# Patient Record
Sex: MALE | Race: WHITE | NOT HISPANIC OR LATINO | Employment: PART TIME | URBAN - METROPOLITAN AREA
[De-identification: names, ages, dates, MRNs, and addresses within clinical notes are randomized per-mention and may not be internally consistent; named-entity substitution may affect disease eponyms.]

---
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
## 2021-04-30 ENCOUNTER — NON-PROVIDER VISIT (OUTPATIENT)
Dept: URGENT CARE | Facility: PHYSICIAN GROUP | Age: 21
End: 2021-04-30

## 2021-04-30 DIAGNOSIS — Z02.1 PRE-EMPLOYMENT DRUG SCREENING: ICD-10-CM

## 2021-04-30 LAB
AMP AMPHETAMINE: NORMAL
COC COCAINE: NORMAL
INT CON NEG: NEGATIVE
INT CON POS: POSITIVE
MET METHAMPHETAMINES: NORMAL
OPI OPIATES: NORMAL
PCP PHENCYCLIDINE: NORMAL
POC DRUG COMMENT 753798-OCCUPATIONAL HEALTH: NEGATIVE
THC: NORMAL

## 2021-04-30 PROCEDURE — 80305 DRUG TEST PRSMV DIR OPT OBS: CPT | Performed by: PHYSICIAN ASSISTANT

## 2023-10-02 ENCOUNTER — APPOINTMENT (OUTPATIENT)
Dept: RADIOLOGY | Facility: MEDICAL CENTER | Age: 23
End: 2023-10-02
Attending: EMERGENCY MEDICINE
Payer: COMMERCIAL

## 2023-10-02 ENCOUNTER — HOSPITAL ENCOUNTER (EMERGENCY)
Facility: MEDICAL CENTER | Age: 23
End: 2023-10-02
Attending: EMERGENCY MEDICINE
Payer: COMMERCIAL

## 2023-10-02 VITALS
TEMPERATURE: 97.5 F | WEIGHT: 160 LBS | OXYGEN SATURATION: 96 % | BODY MASS INDEX: 27.9 KG/M2 | SYSTOLIC BLOOD PRESSURE: 133 MMHG | HEART RATE: 75 BPM | DIASTOLIC BLOOD PRESSURE: 78 MMHG | RESPIRATION RATE: 16 BRPM

## 2023-10-02 DIAGNOSIS — R07.89 CHEST WALL PAIN: ICD-10-CM

## 2023-10-02 LAB
ANION GAP SERPL CALC-SCNC: 15 MMOL/L (ref 7–16)
BUN SERPL-MCNC: 20 MG/DL (ref 8–22)
CALCIUM SERPL-MCNC: 10.3 MG/DL (ref 8.4–10.2)
CHLORIDE SERPL-SCNC: 102 MMOL/L (ref 96–112)
CO2 SERPL-SCNC: 21 MMOL/L (ref 20–33)
CREAT SERPL-MCNC: 0.93 MG/DL (ref 0.5–1.4)
EKG IMPRESSION: NORMAL
GFR SERPLBLD CREATININE-BSD FMLA CKD-EPI: 117 ML/MIN/1.73 M 2
GLUCOSE SERPL-MCNC: 101 MG/DL (ref 65–99)
LIPASE SERPL-CCNC: 35 U/L (ref 11–82)
POTASSIUM SERPL-SCNC: 3.8 MMOL/L (ref 3.6–5.5)
SODIUM SERPL-SCNC: 138 MMOL/L (ref 135–145)
TROPONIN T SERPL-MCNC: <6 NG/L (ref 6–19)

## 2023-10-02 PROCEDURE — 99284 EMERGENCY DEPT VISIT MOD MDM: CPT

## 2023-10-02 PROCEDURE — A9270 NON-COVERED ITEM OR SERVICE: HCPCS | Performed by: EMERGENCY MEDICINE

## 2023-10-02 PROCEDURE — 93005 ELECTROCARDIOGRAM TRACING: CPT | Performed by: EMERGENCY MEDICINE

## 2023-10-02 PROCEDURE — 84484 ASSAY OF TROPONIN QUANT: CPT

## 2023-10-02 PROCEDURE — 71046 X-RAY EXAM CHEST 2 VIEWS: CPT

## 2023-10-02 PROCEDURE — 700102 HCHG RX REV CODE 250 W/ 637 OVERRIDE(OP): Performed by: EMERGENCY MEDICINE

## 2023-10-02 PROCEDURE — 83690 ASSAY OF LIPASE: CPT

## 2023-10-02 PROCEDURE — 36415 COLL VENOUS BLD VENIPUNCTURE: CPT

## 2023-10-02 PROCEDURE — 80048 BASIC METABOLIC PNL TOTAL CA: CPT

## 2023-10-02 RX ORDER — IBUPROFEN 600 MG/1
600 TABLET ORAL ONCE
Status: COMPLETED | OUTPATIENT
Start: 2023-10-02 | End: 2023-10-02

## 2023-10-02 RX ADMIN — IBUPROFEN 600 MG: 600 TABLET, FILM COATED ORAL at 08:07

## 2023-10-02 ASSESSMENT — HEART SCORE
HISTORY: SLIGHTLY SUSPICIOUS
AGE: <45
ECG: NON-SPECIFIC REPOLARIZATION DISTURBANCE
HEART SCORE: 1
RISK FACTORS: NO KNOWN RISK FACTORS
TROPONIN: LESS THAN OR EQUAL TO NORMAL LIMIT

## 2023-10-02 ASSESSMENT — PAIN DESCRIPTION - DESCRIPTORS: DESCRIPTORS: ACHING

## 2023-10-02 NOTE — ED TRIAGE NOTES
Chief Complaint   Patient presents with    Chest Wall Pain     Pt ambultes to ed with c/o chest wall pain that gets worse with inspiration x 1 day, denies recent trauma     /89   Pulse 79   Temp 36.6 °C (97.9 °F) (Temporal)   Resp 16   Wt 72.6 kg (160 lb)   SpO2 99%   BMI 27.90 kg/m²

## 2023-10-02 NOTE — ED PROVIDER NOTES
ED Provider Note    CHIEF COMPLAINT  Chief Complaint   Patient presents with    Chest Wall Pain     Pt ambultes to ed with c/o chest wall pain that gets worse with inspiration x 1 day, denies recent trauma       EXTERNAL RECORDS REVIEWED  Patient has 2 prior encounters.  Both for preemployment screening, drug stress testing.    HPI/ROS  LIMITATION TO HISTORY   Select: : None  OUTSIDE HISTORIAN(S):  none    Ryan Bryan is a 23 y.o. male who presents to the emergency department with a chief complaint of left-sided lower chest wall tenderness.  The area is tender to palpation but there are no overlying skin changes.  Patient denies fever cough or cold symptoms.  Symptoms initially started yesterday morning he woke with similar symptoms.  To a lesser extent, they continued throughout the day.  He woke again with symptoms prompting his ED visit.  He states he was able to eat normally yesterday.  No nausea or vomiting.  No change in his bowel or urine habits.  He has never had symptoms before.  Denies any recent trauma, falls, new activities or exercises.  No shortness of breath.  No recent travel.  No personal history of thromboembolic disease.  No recent injuries.  He takes no medications and has no allergies.  He does not drink, smoke or use drugs.  He denies any significant family history.  No prior surgical history.    PAST MEDICAL HISTORY   None reported    SURGICAL HISTORY  patient denies any surgical history    FAMILY HISTORY  No family history of cardiac disease, stroke or thromboembolic disease    SOCIAL HISTORY  Denies smoking, drinking, drugs      CURRENT MEDICATIONS  Home Medications    **Home medications have not yet been reviewed for this encounter**         ALLERGIES  No Known Allergies    PHYSICAL EXAM  VITAL SIGNS: /78   Pulse 75   Temp 36.4 °C (97.5 °F)   Resp 16   Wt 72.6 kg (160 lb)   SpO2 96%   BMI 27.90 kg/m²    Vitals reviewed.  Constitutional: Patient is oriented to person,  place, and time. Appears well-developed and well-nourished. No distress.    Head: Normocephalic and atraumatic.    Mouth/Throat: Oropharynx is clear.  Eyes: Conjunctivae are normal. Pupils are equal, round.  Neck: Normal range of motion. Neck supple.  Cardiovascular: Normal rate, regular rhythm and normal heart sounds.  Pulmonary/Chest: Effort normal and breath sounds normal. No respiratory distress, no wheezes, rhonchi, or rales.  Left lower, anterior chest wall tenderness.  Abdominal: Soft. Bowel sounds are normal. There is no tenderness, rebound or guarding, or peritoneal signs  Musculoskeletal: No edema and no tenderness.  Neurological: No cranial nerve deficits, on passive examination. Normal gait.  No focal deficits.   Skin: Skin is warm and dry. No erythema. No pallor.   Psychiatric: Patient has a normal mood and affect.     DIAGNOSTIC STUDIES / PROCEDURES  EKG  I have independently interpreted this EKG    LABS  Results for orders placed or performed during the hospital encounter of 10/02/23   TROPONIN   Result Value Ref Range    Troponin T <6 6 - 19 ng/L   Basic Metabolic Panel   Result Value Ref Range    Sodium 138 135 - 145 mmol/L    Potassium 3.8 3.6 - 5.5 mmol/L    Chloride 102 96 - 112 mmol/L    Co2 21 20 - 33 mmol/L    Glucose 101 (H) 65 - 99 mg/dL    Bun 20 8 - 22 mg/dL    Creatinine 0.93 0.50 - 1.40 mg/dL    Calcium 10.3 (H) 8.4 - 10.2 mg/dL    Anion Gap 15.0 7.0 - 16.0   LIPASE   Result Value Ref Range    Lipase 35 11 - 82 U/L   ESTIMATED GFR   Result Value Ref Range    GFR (CKD-EPI) 117 >60 mL/min/1.73 m 2   EKG   Result Value Ref Range    Report       [facility]    Test Date:  2023-10-02  Pt Name:    BETSY LARA              Department: [facility]  MRN:        [id number]                      [facility]  Gender:     Male                         Technician: [id number]  :        2000                   Requested By:MELVIN FRANCIS  Order #:    [id number]                     Reading MD: MELVIN FRANCIS DO    Measurements  Intervals                                Axis  Rate:       72                           P:          50  KS:         130                          QRS:        49  QRSD:       95                           T:          24  QT:         365  QTc:        400    Interpretive Statements  Sinus rhythm  ST elev, probable normal early repol pattern  Baseline wander in lead(s) V1,V2,V3  No previous ECG available for comparison  Electronically Signed On 10- 06:06:18 PDT by MELVIN FRANCIS DO         RADIOLOGY  I have independently interpreted the diagnostic imaging associated with this visit and am waiting the final reading from the radiologist.   My preliminary interpretation is as follows: [facility]  Radiologist interpretation:   DX-CHEST-2 VIEWS   Final Result         1.  No acute cardiopulmonary disease.        COURSE & MEDICAL DECISION MAKING    ED Observation Status? No; Patient does not meet criteria for ED Observation.     INITIAL ASSESSMENT, COURSE AND PLAN  Care Narrative:   This is a pleasant, previously healthy, overall well-appearing 23-year-old male with reassuring vital signs.  He is not tachypneic.  He is not hypoxic.  He has chest wall pain.  He has ST elevation in a and likely early repole pattern.  Clinically, he does not have suggestion of infectious etiology.  I do not suspect pneumonia.  He has no other GI symptoms.  He has minimal risk factors.  He does not smoke, use drugs or have a family history of heart disease.  I have ordered a chest x-ray for further evaluation.  Possibility for a spontaneous pneumothorax.  Additionally, I have ordered a troponin, BNP and lipase. He is PERC negative and has a low HEART score.    8:03 AM Patient is reevaluated at bedside.  Vital signs are reassuring.  We discussed x-ray findings as well as labs which are reassuring.  I do not think that this is cardiac in its etiology.  No evidence of pneumonia.  He is  PERC negative and his heart score is low.  I do not feel further emergent evaluation is indicated.  I suspect it is likely in the musculoskeletal components of his chest wall and I have suggested anti-inflammatories.  He is well-appearing overall and nontoxic.  He is discharged home in stable condition    DISPOSITION AND DISCUSSIONS  I have discussed management of the patient with the following physicians and TIFFANY's:  None    Discussion of management with other QHP or appropriate source(s): None     Escalation of care considered, and ultimately not performed:blood analysis and diagnostic imaging    Barriers to care at this time, including but not limited to: Patient does not have established PCP.     Decision tools and prescription drugs considered including, but not limited to:  HEART score: 1, PERC: Negative    FINAL DIAGNOSIS  1. Chest wall pain           Electronically signed by: Marta Triana D.O., 10/2/2023 5:54 AM